# Patient Record
Sex: FEMALE | Race: BLACK OR AFRICAN AMERICAN | NOT HISPANIC OR LATINO | URBAN - METROPOLITAN AREA
[De-identification: names, ages, dates, MRNs, and addresses within clinical notes are randomized per-mention and may not be internally consistent; named-entity substitution may affect disease eponyms.]

---

## 2022-01-21 ENCOUNTER — OFFICE VISIT (OUTPATIENT)
Dept: URGENT CARE | Facility: CLINIC | Age: 32
End: 2022-01-21
Payer: MEDICAID

## 2022-01-21 VITALS
SYSTOLIC BLOOD PRESSURE: 140 MMHG | OXYGEN SATURATION: 98 % | DIASTOLIC BLOOD PRESSURE: 80 MMHG | TEMPERATURE: 100 F | RESPIRATION RATE: 20 BRPM | WEIGHT: 200 LBS | HEART RATE: 74 BPM | HEIGHT: 64 IN | BODY MASS INDEX: 34.15 KG/M2

## 2022-01-21 DIAGNOSIS — L73.2 HIDRADENITIS SUPPURATIVA: Primary | ICD-10-CM

## 2022-01-21 PROCEDURE — 1160F RVW MEDS BY RX/DR IN RCRD: CPT | Mod: CPTII,S$GLB,,

## 2022-01-21 PROCEDURE — 1159F PR MEDICATION LIST DOCUMENTED IN MEDICAL RECORD: ICD-10-PCS | Mod: CPTII,S$GLB,,

## 2022-01-21 PROCEDURE — 99203 PR OFFICE/OUTPT VISIT, NEW, LEVL III, 30-44 MIN: ICD-10-PCS | Mod: S$GLB,,,

## 2022-01-21 PROCEDURE — 3008F BODY MASS INDEX DOCD: CPT | Mod: CPTII,S$GLB,,

## 2022-01-21 PROCEDURE — 3079F DIAST BP 80-89 MM HG: CPT | Mod: CPTII,S$GLB,,

## 2022-01-21 PROCEDURE — 99203 OFFICE O/P NEW LOW 30 MIN: CPT | Mod: S$GLB,,,

## 2022-01-21 PROCEDURE — 3079F PR MOST RECENT DIASTOLIC BLOOD PRESSURE 80-89 MM HG: ICD-10-PCS | Mod: CPTII,S$GLB,,

## 2022-01-21 PROCEDURE — 3077F PR MOST RECENT SYSTOLIC BLOOD PRESSURE >= 140 MM HG: ICD-10-PCS | Mod: CPTII,S$GLB,,

## 2022-01-21 PROCEDURE — 3077F SYST BP >= 140 MM HG: CPT | Mod: CPTII,S$GLB,,

## 2022-01-21 PROCEDURE — 1160F PR REVIEW ALL MEDS BY PRESCRIBER/CLIN PHARMACIST DOCUMENTED: ICD-10-PCS | Mod: CPTII,S$GLB,,

## 2022-01-21 PROCEDURE — 1159F MED LIST DOCD IN RCRD: CPT | Mod: CPTII,S$GLB,,

## 2022-01-21 PROCEDURE — 3008F PR BODY MASS INDEX (BMI) DOCUMENTED: ICD-10-PCS | Mod: CPTII,S$GLB,,

## 2022-01-21 RX ORDER — ACETAMINOPHEN AND CODEINE PHOSPHATE 300; 30 MG/1; MG/1
1 TABLET ORAL EVERY 4 HOURS PRN
Qty: 20 TABLET | Refills: 0 | Status: SHIPPED | OUTPATIENT
Start: 2022-01-21 | End: 2022-01-26

## 2022-01-21 RX ORDER — DOXYCYCLINE 100 MG/1
100 CAPSULE ORAL 2 TIMES DAILY
Qty: 14 CAPSULE | Refills: 0 | Status: SHIPPED | OUTPATIENT
Start: 2022-01-21 | End: 2022-01-28

## 2022-01-21 NOTE — PROGRESS NOTES
Subjective:       Patient ID: Hermilo Rainey is a 31 y.o. female.    Chief Complaint: Abscess (Left axillary /)    HPI  ROS     Objective:      Physical Exam    Assessment:       No diagnosis found.    Plan:                   No follow-ups on file.

## 2022-01-21 NOTE — PATIENT INSTRUCTIONS
"- Rest.    - Drink plenty of fluids.    - Acetaminophen (tylenol) or Ibuprofen (advil,motrin) as directed as needed for fever/pain. Avoid tylenol if you have a history of liver disease. Do not take ibuprofen if you have a history of GI bleeding, kidney disease, or if you take blood thinners.     - You must understand that you have received an Urgent Care treatment only and that you may be released before all of your medical problems are known or treated.   - You, the patient, will arrange for follow up care as instructed.   - If your condition worsens or fails to improve we recommend that you receive another evaluation at the ER immediately or contact your PCP to discuss your concerns or return here.   - Follow up with your PCP or specialty clinic as directed in the next 1-2 weeks if not improved or as needed.  You can call [phone] to schedule an appointment with the appropriate provider.        If your symptoms do not improve or worsen, go to the emergency room immediately.  Patient Education       Hidradenitis Suppurativa   The Basics   Written by the doctors and editors at [facility]   What is hidradenitis suppurativa? -- Hidradenitis suppurativa, or "HS," is a condition that causes swollen, painful bumps to form on the body. The bumps usually appear in places where the skin rubs together. They can cause so much pain that they make it hard to move. They can smell bad or drain pus or blood. The bumps also tend to last for weeks or months and keep coming back.  People who have HS often have a hard time dealing with their problem. It can make them feel embarrassed and worried. Sometimes the condition can even cause problems in relationships, or in the workplace. If you have this problem, see a doctor or nurse. There are treatments that can help you.  What are the symptoms of HS? -- The main symptoms are swollen, painful bumps. These bumps can drain pus or blood.  The bumps usually form in places where the skin " rubs together. Common locations include:  · Armpits  · On or under the breasts  · Groin area  · Inner thighs  · Buttocks  · Around or near the anus  The skin problems caused by HS last a long time and get worse over time. Often the skin hardens and scars around the painful bumps. Many bumps can form in a single area and sometimes form tunnels under the skin (figure 1).  Should I see a doctor or nurse? -- Yes. If you have symptoms of HS, see a doctor or nurse. They can look at your skin and find out if HS is the cause of your symptoms. Make sure you tell the doctor or nurse if you feel sad, upset, or embarrassed because of your symptoms. They can help you deal with these problems.  It's also important to see your doctor regularly if you have HS. People with HS have a higher chance of getting other health problems, too, such as diabetes and heart disease. Your doctor can check for these problems and suggest treatment if needed.  How is HS treated? -- Treatment can include:  · Antibiotic liquids or gels that you put on the affected skin  · Antibiotic pills, which you might need to take for a few months or longer  · Injections of steroid medicines into affected areas to bring down inflammation  · Hormone pills for some women with HS  · A medicine called adalimumab (brand name: Humira)  · Minor surgery  There are other medicines and treatments that might help people with HS. People with severe, long-lasting problems can have surgery that helps HS to heal.   Is there anything I can do on my own to feel better? -- Yes. First, you should know that you did not do anything to cause your condition. It is not your fault. You did not cause it by being unclean. You should also know that HS is not contagious, and you cannot spread it to other people.  Here are some things you can do to reduce your symptoms:  · Stop smoking, if you smoke. People who smoke are more likely to have HS. If you want to try to quit smoking, your doctor  or nurse can help.  · Try to lose weight, if you are overweight. HS is more common and more severe in people who are overweight. If you want to lose weight, your doctor or nurse can help you come up with a plan to do this in a healthy way.  How can I learn more about hidradenitis suppurativa? -- More information is available online from the following organizations:  · Hidradenitis Suppurativa Foundation (https://www.hs-foundation.org/)  · Hidradenitis Suppurativa Trust (https://www.hstrust.org/)  These are also good resources if you are looking for support from other people living with HS. It can help to talk to people who are going through similar things.  All topics are updated as new evidence becomes available and our peer review process is complete.  This topic retrieved from [employer] on: Sep 21, 2021.  Topic 62849 Version 9.0  Release: 29.4.2 - C29.263  © 2021 UpToDate, Inc. and/or its affiliates. All rights reserved.  figure 1: Hidradenitis suppurativa     Hidradenitis suppurativa causes red, swollen, painful bumps to form on the body (panel A). It usually affects areas where the skin rubs together, like the armpit. Over time, the skin hardens and scars around the bumps. Sometimes, tunnels form under the skin (panel B).  Graphic 391698 Version 1.0    Consumer Information Use and Disclaimer   This information is not specific medical advice and does not replace information you receive from your health care provider. This is only a brief summary of general information. It does NOT include all information about conditions, illnesses, injuries, tests, procedures, treatments, therapies, discharge instructions or life-style choices that may apply to you. You must talk with your health care provider for complete information about your health and treatment options. This information should not be used to decide whether or not to accept your health care provider's advice, instructions or recommendations. Only your health  care provider has the knowledge and training to provide advice that is right for you. The use of this information is governed by the [employer] End User License Agreement, available at https://www.On[employer]Pep.Cover/en/solutions/[employer].Net/about/brad.The use of [employer] content is governed by the [employer] Terms of Use. ©2021 UpToDate, Inc. All rights reserved.  Copyright   © 2021 UpToDate, Inc. and/or its affiliates. All rights reserved.

## 2022-01-21 NOTE — PROGRESS NOTES
"Subjective:       Patient ID: Hermilo Rainey is a 31 y.o. female.    Vitals:  height is 5' 4" (1.626 m) and weight is 90.7 kg (200 lb). Her temperature is 100 °F (37.8 °C). Her blood pressure is 140/80 (abnormal) and her pulse is 74. Her respiration is 20 and oxygen saturation is 98%.     Chief Complaint: Abscess (Left axillary /)    Pt states for 4 days having an abscess under her left axillary , she has a hx of abscesses. She is here for work. She states that she has seen a specialist about this before and they have recommended she get surgery for it.     Abscess  Chronicity:  New  Onset:  3-5 days ago    Associated Symptoms: no fever, no chills      Constitution: Negative for chills, sweating, fatigue and fever.   HENT: Negative for ear pain, congestion, sinus pain, sinus pressure and sore throat.    Neck: Negative for neck pain and neck stiffness.   Eyes: Negative for eye itching, eye pain, eye redness and photophobia.   Gastrointestinal: Negative for nausea, vomiting, constipation and diarrhea.   Musculoskeletal: Negative for pain and trauma.   Skin: Positive for abscess. Negative for color change, pale, erythema and hives.   Allergic/Immunologic: Negative for hives, itching and sneezing.   Neurological: Negative for disorientation and altered mental status.   Psychiatric/Behavioral: Negative for altered mental status, disorientation and confusion.       Objective:      Physical Exam   Constitutional: She is oriented to person, place, and time. She appears well-developed and well-nourished.   HENT:   Head: Normocephalic and atraumatic. Head is without abrasion, without contusion and without laceration.   Ears:   Right Ear: External ear normal.   Left Ear: External ear normal.   Nose: Nose normal.   Mouth/Throat: Oropharynx is clear and moist and mucous membranes are normal.   Eyes: Conjunctivae, EOM and lids are normal. Pupils are equal, round, and reactive to light.   Neck: Trachea normal and " phonation normal. Neck supple.   Cardiovascular: Normal rate, regular rhythm and normal heart sounds.   Pulmonary/Chest: Effort normal and breath sounds normal. No stridor. No respiratory distress.       Musculoskeletal: Normal range of motion.         General: Normal range of motion.   Neurological: She is alert and oriented to person, place, and time.   Skin: Skin is warm, dry, intact, no rash and abscessed. Capillary refill takes less than 2 seconds. No abrasion, No burn, No bruising, No erythema and No ecchymosis   Psychiatric: She has a normal mood and affect. Her speech is normal and behavior is normal. Judgment and thought content normal. Cognition and memory  Nursing note and vitals reviewed.            Assessment:       1. Hidradenitis suppurativa          Plan:         Hidradenitis suppurativa  -     doxycycline (VIBRAMYCIN) 100 MG Cap; Take 1 capsule (100 mg total) by mouth 2 (two) times daily. for 7 days  Dispense: 14 capsule; Refill: 0  -     acetaminophen-codeine 300-30mg (TYLENOL #3) 300-30 mg Tab; Take 1 tablet by mouth every 4 (four) hours as needed (pain).  Dispense: 20 tablet; Refill: 0                  Patient Instructions   - Rest.    - Drink plenty of fluids.    - Acetaminophen (tylenol) or Ibuprofen (advil,motrin) as directed as needed for fever/pain. Avoid tylenol if you have a history of liver disease. Do not take ibuprofen if you have a history of GI bleeding, kidney disease, or if you take blood thinners.     - You must understand that you have received an Urgent Care treatment only and that you may be released before all of your medical problems are known or treated.   - You, the patient, will arrange for follow up care as instructed.   - If your condition worsens or fails to improve we recommend that you receive another evaluation at the ER immediately or contact your PCP to discuss your concerns or return here.   - Follow up with your PCP or specialty clinic as directed in the next 1-2  "weeks if not improved or as needed.  You can call [phone] to schedule an appointment with the appropriate provider.        If your symptoms do not improve or worsen, go to the emergency room immediately.  Patient Education       Hidradenitis Suppurativa   The Basics   Written by the doctors and editors at [facility]   What is hidradenitis suppurativa? -- Hidradenitis suppurativa, or "HS," is a condition that causes swollen, painful bumps to form on the body. The bumps usually appear in places where the skin rubs together. They can cause so much pain that they make it hard to move. They can smell bad or drain pus or blood. The bumps also tend to last for weeks or months and keep coming back.  People who have HS often have a hard time dealing with their problem. It can make them feel embarrassed and worried. Sometimes the condition can even cause problems in relationships, or in the workplace. If you have this problem, see a doctor or nurse. There are treatments that can help you.  What are the symptoms of HS? -- The main symptoms are swollen, painful bumps. These bumps can drain pus or blood.  The bumps usually form in places where the skin rubs together. Common locations include:  · Armpits  · On or under the breasts  · Groin area  · Inner thighs  · Buttocks  · Around or near the anus  The skin problems caused by HS last a long time and get worse over time. Often the skin hardens and scars around the painful bumps. Many bumps can form in a single area and sometimes form tunnels under the skin (figure 1).  Should I see a doctor or nurse? -- Yes. If you have symptoms of HS, see a doctor or nurse. They can look at your skin and find out if HS is the cause of your symptoms. Make sure you tell the doctor or nurse if you feel sad, upset, or embarrassed because of your symptoms. They can help you deal with these problems.  It's also important to see your doctor regularly if you have HS. People with HS have a higher " chance of getting other health problems, too, such as diabetes and heart disease. Your doctor can check for these problems and suggest treatment if needed.  How is HS treated? -- Treatment can include:  · Antibiotic liquids or gels that you put on the affected skin  · Antibiotic pills, which you might need to take for a few months or longer  · Injections of steroid medicines into affected areas to bring down inflammation  · Hormone pills for some women with HS  · A medicine called adalimumab (brand name: Humira)  · Minor surgery  There are other medicines and treatments that might help people with HS. People with severe, long-lasting problems can have surgery that helps HS to heal.   Is there anything I can do on my own to feel better? -- Yes. First, you should know that you did not do anything to cause your condition. It is not your fault. You did not cause it by being unclean. You should also know that HS is not contagious, and you cannot spread it to other people.  Here are some things you can do to reduce your symptoms:  · Stop smoking, if you smoke. People who smoke are more likely to have HS. If you want to try to quit smoking, your doctor or nurse can help.  · Try to lose weight, if you are overweight. HS is more common and more severe in people who are overweight. If you want to lose weight, your doctor or nurse can help you come up with a plan to do this in a healthy way.  How can I learn more about hidradenitis suppurativa? -- More information is available online from the following organizations:  · Hidradenitis Suppurativa Foundation (https://www.hs-foundation.org/)  · Hidradenitis Suppurativa Trust (https://www.hstrust.org/)  These are also good resources if you are looking for support from other people living with HS. It can help to talk to people who are going through similar things.  All topics are updated as new evidence becomes available and our peer review process is complete.  This topic retrieved  from [employer] on: Sep 21, 2021.  Topic 34878 Version 9.0  Release: 29.4.2 - C29.263  © 2021 UpToDate, Inc. and/or its affiliates. All rights reserved.  figure 1: Hidradenitis suppurativa     Hidradenitis suppurativa causes red, swollen, painful bumps to form on the body (panel A). It usually affects areas where the skin rubs together, like the armpit. Over time, the skin hardens and scars around the bumps. Sometimes, tunnels form under the skin (panel B).  Graphic 304202 Version 1.0    Consumer Information Use and Disclaimer   This information is not specific medical advice and does not replace information you receive from your health care provider. This is only a brief summary of general information. It does NOT include all information about conditions, illnesses, injuries, tests, procedures, treatments, therapies, discharge instructions or life-style choices that may apply to you. You must talk with your health care provider for complete information about your health and treatment options. This information should not be used to decide whether or not to accept your health care provider's advice, instructions or recommendations. Only your health care provider has the knowledge and training to provide advice that is right for you. The use of this information is governed by the [employer] End User License Agreement, available at https://www.[employer]isle[employer]er.Elysia/en/solutions/[employer]/about/brad.The use of [employer] content is governed by the [employer] Terms of Use. ©2021 UpToDate, Inc. All rights reserved.  Copyright   © 2021 UpToDate, Inc. and/or its affiliates. All rights reserved.